# Patient Record
Sex: MALE | Race: WHITE
[De-identification: names, ages, dates, MRNs, and addresses within clinical notes are randomized per-mention and may not be internally consistent; named-entity substitution may affect disease eponyms.]

---

## 2019-08-08 ENCOUNTER — HOSPITAL ENCOUNTER (EMERGENCY)
Dept: HOSPITAL 97 - ER | Age: 29
Discharge: HOME | End: 2019-08-08
Payer: COMMERCIAL

## 2019-08-08 DIAGNOSIS — Z88.8: ICD-10-CM

## 2019-08-08 DIAGNOSIS — H53.8: Primary | ICD-10-CM

## 2019-08-08 DIAGNOSIS — Z77.098: ICD-10-CM

## 2019-08-08 PROCEDURE — 99283 EMERGENCY DEPT VISIT LOW MDM: CPT

## 2019-08-08 NOTE — ER
Nurse's Notes                                                                                     

 [facility]                                                                 

Name: Lg Mccord                                                                               

Age: 29 yrs                                                                                       

Sex: Male                                                                                         

: 1990                                                                                   

MRN: [id number]                                                                                   

Arrival Date: 2019                                                                          

Time: 16:24                                                                                       

Account#: [id number]                                                                            

Bed 20                                                                                            

Private MD:                                                                                       

Diagnosis: Chemical Exposure to right eye.                                                        

                                                                                                  

Presentation:                                                                                     

                                                                                             

16:24 Presenting complaint: EMS states: EXPOSURE TO 50% NaOH INCLUDING EYES. Transition of    bp  

      care: patient was not received from another setting of care. Onset of symptoms was          

      2019 at 14:50. Risk Assessment: Do you want to hurt yourself or someone          

      else? Patient reports no desire to harm self or others. Initial Sepsis Screen: Does the     

      patient meet any 2 criteria? No. Patient's initial sepsis screen is negative. Does the      

      patient have a suspected source of infection? No. Patient's initial sepsis screen is        

      negative. Care prior to arrival: BILATERAL LORNE LENS.                                     

16:24 Method Of Arrival: EMS: [facility]                                                         bp  

16:24 Acuity: XIN 3                                                                           bp  

                                                                                                  

Triage Assessment:                                                                                

16:26 General: Appears in no apparent distress. comfortable, Behavior is calm, cooperative,   bp  

      appropriate for age. Pain: Denies pain. EENT: Eyes IRRITATED. EENT: Reports INTACT          

      VISION. Neuro: No deficits noted. Cardiovascular: No deficits noted. Respiratory:           

      Airway is patent. GI: No signs and/or symptoms were reported involving the                  

      gastrointestinal system. : No signs and/or symptoms were reported regarding the           

      genitourinary system. Derm: No deficits noted. Musculoskeletal: No deficits noted.          

                                                                                                  

Historical:                                                                                       

- Allergies:                                                                                      

16:26 Phenergan;                                                                              bp  

- Home Meds:                                                                                      

16:26 None [Active];                                                                          bp  

- PMHx:                                                                                           

16:26 None;                                                                                   bp  

                                                                                                  

- Immunization history:: Adult Immunizations up to date.                                          

- Social history:: Smoking status: Patient/guardian denies using tobacco.                         

- Ebola Screening: : No symptoms or risks identified at this time.                                

                                                                                                  

                                                                                                  

Screenin:29 Abuse screen: Denies threats or abuse. Denies injuries from another. Nutritional        bp  

      screening: No deficits noted. Tuberculosis screening: No symptoms or risk factors           

      identified. Fall Risk None identified.                                                      

                                                                                                  

Assessment:                                                                                       

16:28 General: SEE TRIAGE NOTE.                                                               bp  

17:30 Reassessment: BILATERAL EYE IRRIGATION CONTINUING.                                      bp  

18:25 Reassessment: OPTHO C/S ARRIVAL PENDING FOR R EYE EVAL.                                 bp  

19:03 Reassessment: OPTHO C/S AT B/S.                                                         bp  

19:20 General: Appears in no apparent distress. uncomfortable, Behavior is calm, cooperative, rr5 

      appropriate for age.                                                                        

19:20 Pain: Complains of pain in right eye Pain does not radiate. Pain Quality of pain is     rr5 

      described as aching, Pain began suddenly, Is intermittent. Neuro: Level of                  

      Consciousness is awake, alert, obeys commands, Oriented to person, place, time,             

      situation, Appropriate for age. Cardiovascular: Capillary refill < 3 seconds Patient's      

      skin is warm and dry. Respiratory: Airway is patent Respiratory effort is even,             

      unlabored, Respiratory pattern is regular, symmetrical. GI: No signs and/or symptoms        

      were reported involving the gastrointestinal system. : No signs and/or symptoms were      

      reported regarding the genitourinary system. EENT: Eyes teary eyes, redness and             

      complaining of irritated right eye.. Derm: Skin is pink, warm \T\ dry. Skin temperature     

      is warm. Musculoskeletal: Circulation, motion, and sensation intact. Capillary refill <     

      3 seconds.                                                                                  

20:40 Reassessment: Patient appears in no apparent distress at this time. Patient and/or      rr5 

      family updated on plan of care and expected duration. Pain level reassessed. Patient is     

      alert, oriented x 3, equal unlabored respirations, skin warm/dry/pink. awaiting for the     

      bag of NS fluid (for eye irrigation) to be consume before discharge.                        

21:30 Reassessment: Patient appears in no apparent distress at this time. Patient and/or      rr5 

      family updated on plan of care and expected duration. Pain level reassessed.                

22:15 Reassessment: Patient appears in no apparent distress at this time. Patient is alert,   rr5 

      oriented x 3, equal unlabored respirations, skin warm/dry/pink. Bag of NS consumed.         

      lorne lens removed. discharge instruction given and explained without complaints made.     

                                                                                                  

Vital Signs:                                                                                      

16:28  / 88; Pulse 88; Resp 13; Temp 98.6; Pulse Ox 95% ; Weight 97.52 kg; Height 5 ft. bp  

      10 in. (177.80 cm);                                                                         

17:30  / 70; Pulse 91; Resp 15; Pulse Ox 100% ;                                         bp  

18:26  / 79; Pulse 73; Resp 15; Pulse Ox 98% ;                                          bp  

19:30  / 76; Pulse 71; Resp 17; Pulse Ox 98% ;                                          rr5 

20:30  / 69; Pulse 73; Resp 17; Temp 98.5; Pulse Ox 100% ;                              rr5 

21:15  / 79; Pulse 80; Resp 16; Pulse Ox 98% on R/A;                                    rr5 

22:00  / 70; Pulse 79; Resp 18; Pulse Ox 99% ;                                          rr5 

16:28 Body Mass Index 30.85 (97.52 kg, 177.80 cm)                                             bp  

                                                                                                  

ED Course:                                                                                        

16:24 Patient arrived in ED.                                                                  bp  

16:26 Triage completed.                                                                       bp  

16:27 Ozzie Balderas MD is Attending Physician.                                              kdr 

16:28 Arm band placed on.                                                                     bp  

16:29 Patient has correct armband on for positive identification. Placed in gown. Bed in low  bp  

      position. Call light in reach. Side rails up X2.                                            

18:24 Cong Baez, RN is Primary Nurse.                                                    bp  

20:16 Ashleigh Culver MD is Referral Physician.                                               ps1 

22:22 No provider procedures requiring assistance completed. Patient did not have IV access   rr5 

      during this emergency room visit.                                                           

                                                                                                  

Administered Medications:                                                                         

19:26 Drug: Tylenol 650 mg Route: PO;                                                         rr5 

20:30 Follow up: Response: No adverse reaction                                                rr5 

                                                                                                  

                                                                                                  

Outcome:                                                                                          

20:17 Discharge ordered by MD.                                                                ps1 

22:15 Discharged to home ambulatory.                                                          rr5 

22:15 Condition: stable                                                                           

22:15 Discharge instructions given to patient, family, Instructed on discharge instructions,      

      follow up and referral plans. medication usage, Demonstrated understanding of               

      instructions, follow-up care, medications, Prescriptions given X 2.                         

22:19 Patient left the ED.                                                                    rr5 

                                                                                                  

Signatures:                                                                                       

Ozzie Balderas MD MD   kdr                                                  

Cong Baez, RN                      RN   bp                                                   

Feng Smith MD MD   ps1                                                  

Campos Angel RN                      RN   rr5                                                  

                                                                                                  

**************************************************************************************************

## 2019-08-08 NOTE — EDPHYS
Physician Documentation                                                                           

 [facility]                                                                 

Name: Lg Mccord                                                                               

Age: 29 yrs                                                                                       

Sex: Male                                                                                         

: 1990                                                                                   

MRN: [id number]                                                                                   

Arrival Date: 2019                                                                          

Time: 16:24                                                                                       

Account#: [id number]                                                                            

Bed 20                                                                                            

Private MD:                                                                                       

ED Physician Ozzie Balderas                                                                       

HPI:                                                                                              

                                                                                             

18:53 This 29 yrs old  Male presents to ER via EMS with complaints of Chemical       kdr 

      Exposure.                                                                                   

18:53 The patient had splash/exposure to the upper body and face with a 50% sodium hydroxide  kdr 

      solution. He was immediately decontaminated at the work site for more than 30 minutes.      

      Then brought to the ED for further evaluation and treatment. He had no other c/o in the     

      ED. Onset: The symptoms/episode began/occurred acutely, suddenly, just prior to             

      arrival. Severity of symptoms: At their worst the symptoms were mild in the emergency       

      department the symptoms are unchanged. The patient has not experienced similar symptoms     

      in the past. The patient has not recently seen a physician.                                 

                                                                                                  

Historical:                                                                                       

- Allergies:                                                                                      

16:26 Phenergan;                                                                              bp  

- Home Meds:                                                                                      

16:26 None [Active];                                                                          bp  

- PMHx:                                                                                           

16:26 None;                                                                                   bp  

                                                                                                  

- Immunization history:: Adult Immunizations up to date.                                          

- Social history:: Smoking status: Patient/guardian denies using tobacco.                         

- Ebola Screening: : No symptoms or risks identified at this time.                                

                                                                                                  

                                                                                                  

ROS:                                                                                              

18:53 Constitutional: Negative for fever, chills, and weight loss, ENT: Negative for injury,  kdr 

      pain, and discharge, Neck: Negative for injury, pain, and swelling, Cardiovascular:         

      Negative for chest pain, palpitations, and edema, Respiratory: Negative for shortness       

      of breath, cough, wheezing, and pleuritic chest pain, Abdomen/GI: Negative for              

      abdominal pain, nausea, vomiting, diarrhea, and constipation, Back: Negative for injury     

      and pain, : Negative for injury, bleeding, discharge, and swelling, MS/Extremity:         

      Negative for injury and deformity, Skin: Negative for injury, rash, and discoloration,      

      Neuro: Negative for headache, weakness, numbness, tingling, and seizure activity.           

      Psych: Negative for depression, anxiety, suicide ideation, homicidal ideation, and          

      hallucinations, Allergy/Immunology: Negative for hives, rash, and allergies, Endocrine:     

      Negative for neck swelling, polydipsia, polyuria, polyphagia, and marked weight             

      changes, Hematologic/Lymphatic: Negative for swollen nodes, abnormal bleeding, and          

      unusual bruising.                                                                           

18:53 Eyes: Positive for blurry vision, redness.                                                  

                                                                                                  

Exam:                                                                                             

18:53 Constitutional:  This is a well developed, well nourished patient who is awake, alert,  kdr 

      and in no acute distress.                                                                   

18:53 Head/face: Noted is erythema, that is mild, of the  forehead, right eye, right cheek,       

      right ear, left cheek and right temple.                                                     

18:53 Eyes: Periorbital structures: appear normal, Pupils: equal, round, and reactive to          

      light and accomodation, Extraocular movements: intact throughout, Conjunctiva:              

      injected, in the right eye, Corneas: abrasion, that is large, on the right, a               

      fluorescein strip employed to appreciate the findings, Sclera: Significant uptake to        

      the lower half of the eye.                                                                  

                                                                                                  

Vital Signs:                                                                                      

16:28  / 88; Pulse 88; Resp 13; Temp 98.6; Pulse Ox 95% ; Weight 97.52 kg; Height 5 ft. bp  

      10 in. (177.80 cm);                                                                         

17:30  / 70; Pulse 91; Resp 15; Pulse Ox 100% ;                                         bp  

18:26  / 79; Pulse 73; Resp 15; Pulse Ox 98% ;                                          bp  

19:30  / 76; Pulse 71; Resp 17; Pulse Ox 98% ;                                          rr5 

20:30  / 69; Pulse 73; Resp 17; Temp 98.5; Pulse Ox 100% ;                              rr5 

21:15  / 79; Pulse 80; Resp 16; Pulse Ox 98% on R/A;                                    rr5 

22:00  / 70; Pulse 79; Resp 18; Pulse Ox 99% ;                                          rr5 

16:28 Body Mass Index 30.85 (97.52 kg, 177.80 cm)                                             bp  

                                                                                                  

MDM:                                                                                              

20:17 Patient medically screened.                                                             ps1 

20:17 Data reviewed: vital signs, nurses notes. Counseling: I had a detailed discussion with  ps1 

      the patient and/or guardian regarding: the historical points, exam findings, and any        

      diagnostic results supporting the discharge/admit diagnosis, the need for outpatient        

      follow up, an opthalmologist, to return to the emergency department if symptoms worsen      

      or persist or if there are any questions or concerns that arise at home. ED course: Dr. Hewitt evaluated patient see her notes for details. Ok for discharge and rx steroids      

      and will see patient in office tomorrow. pH normalized. .                                   

                                                                                                  

Administered Medications:                                                                         

19:26 Drug: Tylenol 650 mg Route: PO;                                                         rr5 

20:30 Follow up: Response: No adverse reaction                                                rr5 

                                                                                                  

                                                                                                  

Disposition:                                                                                      

19 20:17 Discharged to Home. Impression: Chemical Exposure to right eye..                   

- Condition is Stable.                                                                            

- Discharge Instructions: Chemical Conjunctivitis, Adult.                                         

- Prescriptions for Polytrim 10,000 unit- 1 mg/mL Ophthalmic drops - instill 1 drop by            

  OPHTHALMIC route every 6 hours; 1 bottle. prednisolone acetate 1 % Ophthalmic                   

  drops,suspension - instill 1 drop by OPHTHALMIC route 4 times per day; 1 bottle.                

- Medication Reconciliation Form, Thank You Letter, Antibiotic Education, Prescription            

  Opioid Use form.                                                                                

- Follow up: Ashleigh Culver MD; When: Tomorrow; Reason: Further diagnostic work-up,              

  Recheck today's complaints, Continuance of care, Re-evaluation by your physician.               

  Follow up: Emergency Department; When: As needed; Reason: Worsening of condition.               

- Problem is new.                                                                                 

- Symptoms have improved.                                                                         

                                                                                                  

                                                                                                  

                                                                                                  

Signatures:                                                                                       

Ozzie Balderas MD MD   kdr                                                  

Cong Baez RN                      RN   bp                                                   

Feng Smith MD MD   ps1                                                  

Campos Angel RN                      RN   rr5                                                  

                                                                                                  

Corrections: (The following items were deleted from the chart)                                    

22:19 20:17 2019 20:17 Discharged to Home. Impression: Chemical Exposure to right eye.. rr5 

      Condition is Stable. Forms are Medication Reconciliation Form, Thank You Letter,            

      Antibiotic Education, Prescription Opioid Use. Follow up: Ashleigh Culver; When:              

      Tomorrow; Reason: Further diagnostic work-up, Recheck today's complaints, Continuance       

      of care, Re-evaluation by your physician. Follow up: Emergency Department; When: As         

      needed; Reason: Worsening of condition. Problem is new. Symptoms have improved. ps1         

                                                                                                  

**************************************************************************************************

## 2019-08-09 NOTE — CON
Requesting Physician:  Ozzie Balderas MD



History Of Present Illness:  Mr. Mccord is a 29-year-old white male who was 
working today and splashed 50% sodium hydroxide in the liquid form in his right 
eye at 2:50 p.m.  He was wearing safety glasses.  He went straight to the 
shower and performed eyewash for an hour and EMS also used irrigation in 
transit. He has had irrigation of the eye ever in the ER since arriving. I 
arrived in the ER at about 6:50 p.m. and irrigation was ongoing.



Past Medical History:  Negative.



Past Surgical History:  Negative.



Allergies:  HE HAS NO KNOWN DRUG ALLERGIES.



Social History:  He is  and does not smoke tobacco.



Medications:  He is on no medications.



Family History:  Negative.



Review of Systems:

Ears, Nose, Throat:  Negative. 

Cardiac:  Negative. 

Lungs:  Negative. 

Musculoskeletal:  Negative. 

GI:  Negative. 

:  Negative. 

Endocrine:  Negative. 

Hematologic:  Negative. 

Immunologic:  Negative. 

Psychologic:  Negative. 

Skin:  He has mild burn on the right side of his face in the temporal area.



Ocular Examination:  He is alert and oriented x3.  His near vision without 
correction is 20/25 in the right eye and 20/25 in the left eye.  He is 
orthophoric.  He has full versions, both eyes.  His pupils are equal, round, 
and reactive to light.  There is no afferent pupillary defect.  His 
confrontation fields are full, both eyes.  His lids; there is mild erythema at 
the temporal aspect of the right side of his face and left lids are normal.  
His right lid was everted.  There was no foreign body underneath the lid.  
Conjunctivae; 1 to 2+ injection with no blanching on the right eye, within 
normal limits on the left.  Cornea diffuse fluorescein uptake, the right eye 
within normal limits left eye.  His anterior chamber is formed OU.  His lens is 
clear in both eyes.  He is dilated with 2.5% phenylephrine and 1% tropicamide.  
On dilated funduscopic examination, his macula, vasculature, optic nerve, and 
periphery are all within normal limits in both eyes.



Impression:  My impression is that the patient has a caustic injury of right 
eye.  There is a diffuse corneal abrasion with no blanching of the conjunctiva.



Plan:  The plan is to have him use preservative-free tears every hour while 
awake, Polytrim 4 times a day, and Pred Forte 4 times a day in the right eye.  
He is to follow up with Dr. Culver in the morning.





ÁNGEL/ADDIS

DD:  08/08/2019 19:47:58   Voice ID:  [id number]

DT:  08/09/2019 01:25:08   Report ID:  [id number]

MTDLA